# Patient Record
Sex: FEMALE | Race: WHITE | NOT HISPANIC OR LATINO | Employment: PART TIME | URBAN - METROPOLITAN AREA
[De-identification: names, ages, dates, MRNs, and addresses within clinical notes are randomized per-mention and may not be internally consistent; named-entity substitution may affect disease eponyms.]

---

## 2024-05-21 ENCOUNTER — OFFICE VISIT (OUTPATIENT)
Dept: FAMILY MEDICINE | Facility: CLINIC | Age: 59
End: 2024-05-21
Payer: COMMERCIAL

## 2024-05-21 VITALS
OXYGEN SATURATION: 97 % | HEART RATE: 75 BPM | SYSTOLIC BLOOD PRESSURE: 122 MMHG | HEIGHT: 71 IN | WEIGHT: 225.19 LBS | BODY MASS INDEX: 31.52 KG/M2 | DIASTOLIC BLOOD PRESSURE: 64 MMHG

## 2024-05-21 DIAGNOSIS — Z00.00 ANNUAL PHYSICAL EXAM: Primary | ICD-10-CM

## 2024-05-21 DIAGNOSIS — Z12.31 SCREENING MAMMOGRAM, ENCOUNTER FOR: ICD-10-CM

## 2024-05-21 DIAGNOSIS — Z11.59 NEED FOR HEPATITIS C SCREENING TEST: ICD-10-CM

## 2024-05-21 DIAGNOSIS — Z12.11 COLON CANCER SCREENING: ICD-10-CM

## 2024-05-21 DIAGNOSIS — Z83.3 FAMILY HISTORY OF DIABETES MELLITUS IN MOTHER: ICD-10-CM

## 2024-05-21 LAB — HBA1C MFR BLD: 5.3 %

## 2024-05-21 PROCEDURE — 3074F SYST BP LT 130 MM HG: CPT | Mod: CPTII,S$GLB,, | Performed by: NURSE PRACTITIONER

## 2024-05-21 PROCEDURE — 83036 HEMOGLOBIN GLYCOSYLATED A1C: CPT | Mod: QW,,, | Performed by: NURSE PRACTITIONER

## 2024-05-21 PROCEDURE — 3044F HG A1C LEVEL LT 7.0%: CPT | Mod: CPTII,S$GLB,, | Performed by: NURSE PRACTITIONER

## 2024-05-21 PROCEDURE — 99386 PREV VISIT NEW AGE 40-64: CPT | Mod: S$GLB,,, | Performed by: NURSE PRACTITIONER

## 2024-05-21 PROCEDURE — 3078F DIAST BP <80 MM HG: CPT | Mod: CPTII,S$GLB,, | Performed by: NURSE PRACTITIONER

## 2024-05-21 PROCEDURE — 1160F RVW MEDS BY RX/DR IN RCRD: CPT | Mod: CPTII,S$GLB,, | Performed by: NURSE PRACTITIONER

## 2024-05-21 PROCEDURE — 1159F MED LIST DOCD IN RCRD: CPT | Mod: CPTII,S$GLB,, | Performed by: NURSE PRACTITIONER

## 2024-05-21 PROCEDURE — 3008F BODY MASS INDEX DOCD: CPT | Mod: CPTII,S$GLB,, | Performed by: NURSE PRACTITIONER

## 2024-05-21 RX ORDER — CALCIUM CARBONATE 600 MG
1 TABLET ORAL DAILY
COMMUNITY

## 2024-05-21 RX ORDER — MULTIVITAMIN
1 TABLET ORAL DAILY
COMMUNITY

## 2024-05-21 RX ORDER — ZINC GLUCONATE 50 MG
50 TABLET ORAL DAILY
COMMUNITY

## 2024-05-21 RX ORDER — ACETAMINOPHEN 500 MG
500 TABLET ORAL EVERY 6 HOURS PRN
COMMUNITY

## 2024-05-21 RX ORDER — MINERAL OIL
180 ENEMA (ML) RECTAL DAILY
COMMUNITY

## 2024-05-21 RX ORDER — CYANOCOBALAMIN (VITAMIN B-12) 1000MCG/ML
DROPS ORAL
COMMUNITY

## 2024-05-21 NOTE — PATIENT INSTRUCTIONS
Please return for fasting bloodwork within the next 2 weeks    Naveed Bonilla MD- GI- call to schedule colonoscopy  [id number] [id number]  Phone: [phone]

## 2024-05-21 NOTE — PROGRESS NOTES
SUBJECTIVE:    Patient ID: Sussy Bowers is a 58 y.o. female.    Chief Complaint: Establish Care (Bottles brought//Pt is here to establish care with a PCP//WILIAM )    HPI    No visits with results within 6 Month(s) from this visit.   Latest known visit with results is:   No results found for any previous visit.       History reviewed. No pertinent past medical history.  Past Surgical History:   Procedure Laterality Date    CHOLECYSTECTOMY  1996    DENTAL SURGERY  2014     Family History   Problem Relation Name Age of Onset    Diabetes Mother      Kidney disease Brother      Cancer Maternal Uncle          lung    Diabetes Maternal Grandmother         Tests to Keep You Healthy    Mammogram: DUE  Colon Cancer Screening: DUE  Cervical Cancer Screening: DUE      The CVD Risk score (EESQUIEL'Agostino, et al., 2008) failed to calculate for the following reasons:    Cannot find a previous HDL lab    Cannot find a previous total cholesterol lab     Marital Status: Single  Alcohol History:  reports current alcohol use.  Tobacco History:  reports that she has never smoked. She has never been exposed to tobacco smoke. She has never used smokeless tobacco.  Drug History:  reports no history of drug use.    Health Maintenance Topics with due status: Not Due       Topic Last Completion Date    Influenza Vaccine Not Due       There is no immunization history on file for this patient.    Review of patient's allergies indicates:  No Known Allergies    Current Outpatient Medications:     acetaminophen (TYLENOL) 500 MG tablet, Take 500 mg by mouth every 6 (six) hours as needed for Pain., Disp: , Rfl:     calcium carbonate (OS-INDERJIT) 600 mg calcium (1,500 mg) Tab, Take 1 tablet by mouth once daily., Disp: , Rfl:     Echinacea pur xt/goldenseal xt (ECHINACEA AND GOLDENSEAL ORAL), Take 900 mg by mouth once daily., Disp: , Rfl:     fexofenadine (ALLEGRA) 180 MG tablet, Take 180 mg by mouth once daily., Disp: , Rfl:     melatonin 1 mg Subl, Place  "under the tongue., Disp: , Rfl:     multivitamin (THERAGRAN) per tablet, Take 1 tablet by mouth once daily., Disp: , Rfl:     zinc gluconate 50 mg tablet, Take 50 mg by mouth once daily., Disp: , Rfl:     Review of Systems   Constitutional:  Negative for appetite change, chills and fever.   Respiratory:  Negative for cough, shortness of breath and wheezing.    Cardiovascular:  Negative for chest pain, palpitations and leg swelling.   Gastrointestinal:  Negative for abdominal pain, constipation and diarrhea.   Genitourinary:  Negative for dysuria, frequency and hematuria.   Musculoskeletal:  Negative for back pain and gait problem.   Skin:  Negative for rash.   Neurological:  Negative for dizziness, syncope and numbness.   Psychiatric/Behavioral:  Negative for dysphoric mood. The patient is not nervous/anxious.           Objective:      Vitals:    05/21/24 1456   BP: 122/64   Pulse: 75   SpO2: 97%   Weight: 102.2 kg (225 lb 3.2 oz)   Height: 5' 10.5" (1.791 m)     Physical Exam  Vitals reviewed.   Constitutional:       General: She is not in acute distress.     Appearance: She is well-developed.   HENT:      Head: Normocephalic and atraumatic.      Right Ear: Tympanic membrane and ear canal normal.      Left Ear: Tympanic membrane and ear canal normal.      Mouth/Throat:      Pharynx: No posterior oropharyngeal erythema.   Neck:      Vascular: No carotid bruit.   Cardiovascular:      Rate and Rhythm: Normal rate and regular rhythm.      Heart sounds: No murmur heard.  Pulmonary:      Effort: Pulmonary effort is normal. No respiratory distress.      Breath sounds: Normal breath sounds. No wheezing or rales.   Abdominal:      General: There is no distension.      Palpations: Abdomen is soft.      Tenderness: There is no abdominal tenderness.   Musculoskeletal:      Cervical back: Neck supple.      Right lower leg: No edema.      Left lower leg: No edema.   Lymphadenopathy:      Cervical: No cervical adenopathy. "   Skin:     General: Skin is warm and dry.      Findings: No rash.   Neurological:      General: No focal deficit present.      Mental Status: She is alert and oriented to person, place, and time.      Gait: Gait normal.   Psychiatric:         Mood and Affect: Mood normal.           Assessment:       1. Annual physical exam           Plan:       1. Annual physical exam      No follow-ups on file.          Counseled on age and gender appropriate medical preventative services, including cancer screenings, immunizations, overall nutritional health, need for a consistent exercise regimen and an overall push towards maintaining a vigorous and active lifestyle.      5/21/2024 Elida Robles NP

## 2024-05-21 NOTE — PROGRESS NOTES
"  SUBJECTIVE:    Patient ID: Sussy Bowers is a 58 y.o. female.    Chief Complaint: Establish Care (Bottles brought//Pt is here to establish care with a PCP//WILIAM )      Pt here for new pt appt. Former pt of Dr. Irving but hasn't had PCP in several years    Pt reports overall she's been healthy. No prescription medication. Nonsmoker, drinks 2-3 beers on the weekends. Admits doesn't really follow a diet and hasn't been walking for exercise since her dog .    Pt reports at last eye exam in Feb was told they saw "a christopher" in her eye which could be indication of early DM. Denies any blurred vision. no polyuria, polydipsia or polyphagia. Her mother had DM2    Seen in ER last month after cutting finger cutting limbs with a chainsaw- had lac to left 3rd finger which required sutures- no tendon injury and wound completely healed with full ROM/sensation intact    Has never had colon CA screening and last mammogram was years ago      Office Visit on 2024   Component Date Value Ref Range Status    Hemoglobin A1C, POC 2024 5.3  % Final       History reviewed. No pertinent past medical history.  Past Surgical History:   Procedure Laterality Date    CHOLECYSTECTOMY      DENTAL SURGERY      TONSILLECTOMY       Family History   Problem Relation Name Age of Onset    Diabetes Mother      Rheum arthritis Mother      Atrial fibrillation Mother      Stroke Brother      Kidney disease Brother      Cancer Maternal Uncle          lung    Diabetes Maternal Grandmother      Cancer Maternal Cousin          breast CA       Tests to Keep You Healthy    Mammogram: ORDERED BUT NOT SCHEDULED  Colon Cancer Screening: DUE  Cervical Cancer Screening: DUE      Marital Status: Single  Alcohol History:  reports current alcohol use of about 4.0 - 6.0 standard drinks of alcohol per week.  Tobacco History:  reports that she has never smoked. She has never been exposed to tobacco smoke. She has never used smokeless tobacco.  Drug " "History:  reports no history of drug use.    Review of patient's allergies indicates:  No Known Allergies    Current Outpatient Medications:     acetaminophen (TYLENOL) 500 MG tablet, Take 500 mg by mouth every 6 (six) hours as needed for Pain., Disp: , Rfl:     calcium carbonate (OS-INDERJIT) 600 mg calcium (1,500 mg) Tab, Take 1 tablet by mouth once daily., Disp: , Rfl:     Echinacea pur xt/goldenseal xt (ECHINACEA AND GOLDENSEAL ORAL), Take 900 mg by mouth once daily., Disp: , Rfl:     fexofenadine (ALLEGRA) 180 MG tablet, Take 180 mg by mouth once daily., Disp: , Rfl:     melatonin 1 mg Subl, Place under the tongue., Disp: , Rfl:     multivitamin (THERAGRAN) per tablet, Take 1 tablet by mouth once daily., Disp: , Rfl:     zinc gluconate 50 mg tablet, Take 50 mg by mouth once daily., Disp: , Rfl:     Review of Systems   Constitutional:  Negative for appetite change, chills, fever and unexpected weight change.   HENT:  Negative for sore throat and trouble swallowing.    Eyes:  Negative for visual disturbance.   Respiratory:  Negative for cough, shortness of breath and wheezing.    Cardiovascular:  Negative for chest pain, palpitations and leg swelling.   Gastrointestinal:  Negative for abdominal pain, constipation, diarrhea, nausea and vomiting.   Endocrine: Negative for polydipsia, polyphagia and polyuria.   Genitourinary:  Negative for dysuria, frequency and hematuria.   Musculoskeletal:  Positive for arthralgias (occasional knee pain). Negative for back pain and gait problem.   Skin:  Negative for rash.   Neurological:  Negative for dizziness, syncope, speech difficulty, numbness and headaches.   Psychiatric/Behavioral:  Negative for dysphoric mood. The patient is not nervous/anxious.           Objective:      Vitals:    05/21/24 1456   BP: 122/64   Pulse: 75   SpO2: 97%   Weight: 102.2 kg (225 lb 3.2 oz)   Height: 5' 10.5" (1.791 m)     Physical Exam  Vitals reviewed.   Constitutional:       General: She is not in " acute distress.     Appearance: She is well-developed. She is obese.      Comments: Obese WF   HENT:      Head: Normocephalic and atraumatic.      Right Ear: Tympanic membrane and ear canal normal.      Left Ear: Tympanic membrane and ear canal normal.   Neck:      Vascular: No carotid bruit.   Cardiovascular:      Rate and Rhythm: Normal rate and regular rhythm.      Heart sounds: No murmur heard.  Pulmonary:      Effort: Pulmonary effort is normal. No respiratory distress.      Breath sounds: Normal breath sounds. No wheezing or rales.   Abdominal:      General: There is no distension.      Palpations: Abdomen is soft.      Tenderness: There is no abdominal tenderness.   Musculoskeletal:      Cervical back: Neck supple.      Right lower leg: No edema.      Left lower leg: No edema.   Lymphadenopathy:      Cervical: No cervical adenopathy.   Skin:     General: Skin is warm and dry.      Findings: No rash.   Neurological:      General: No focal deficit present.      Mental Status: She is alert and oriented to person, place, and time.      Gait: Gait normal.   Psychiatric:         Mood and Affect: Mood normal.           Assessment:       1. Annual physical exam    2. Family history of diabetes mellitus in mother    3. Colon cancer screening    4. Screening mammogram, encounter for    5. Need for hepatitis C screening test           Plan:       Annual physical exam  -baseline labs ordered  -     CBC Auto Differential; Future; Expected date: 05/21/2024  -     Comprehensive Metabolic Panel; Future; Expected date: 05/21/2024  -     Lipid Panel; Future; Expected date: 05/21/2024  -     TSH w/reflex to FT4; Future; Expected date: 05/21/2024  -     Urinalysis, Reflex to Urine Culture Urine, Clean Catch; Future; Expected date: 05/21/2024    Family history of diabetes mellitus in mother  -A1C today 5.3%. encouraged healthy diet changes and regular exercise  -     Hemoglobin A1C, POCT    Colon cancer screening  -discussed imp  of colon CA screening and encouraged her to schedule cscope  -     Ambulatory referral/consult to Gastroenterology; Future; Expected date: 05/28/2024    Screening mammogram, encounter for  -mammo ordered  -     Mammo Digital Screening Bilat w/ Pablo; Future; Expected date: 05/21/2024    Need for hepatitis C screening test  -     Hepatitis C Antibody; Future; Expected date: 05/21/2024      Follow up in about 6 months (around 11/21/2024) for labs within next 2 weeks.        5/21/2024 Elida Robles, NP

## 2024-05-24 ENCOUNTER — HOSPITAL ENCOUNTER (OUTPATIENT)
Dept: RADIOLOGY | Facility: HOSPITAL | Age: 59
Discharge: HOME | End: 2024-05-24
Attending: NURSE PRACTITIONER
Payer: COMMERCIAL

## 2024-05-24 DIAGNOSIS — Z12.31 SCREENING MAMMOGRAM, ENCOUNTER FOR: ICD-10-CM

## 2024-05-24 PROCEDURE — 77063 BREAST TOMOSYNTHESIS BI: CPT | Mod: 26,,, | Performed by: RADIOLOGY

## 2024-05-24 PROCEDURE — 77067 SCR MAMMO BI INCL CAD: CPT | Mod: 26,,, | Performed by: RADIOLOGY

## 2024-05-24 PROCEDURE — 77067 SCR MAMMO BI INCL CAD: CPT | Mod: TC

## 2024-05-27 LAB
ALBUMIN SERPL-MCNC: 4.2 G/DL (ref 3.6–5.1)
ALBUMIN/GLOB SERPL: 1.7 (CALC) (ref 1–2.5)
ALP SERPL-CCNC: 73 U/L (ref 37–153)
ALT SERPL-CCNC: 11 U/L (ref 6–29)
APPEARANCE UR: CLEAR
AST SERPL-CCNC: 11 U/L (ref 10–35)
BACTERIA #/AREA URNS HPF: ABNORMAL /HPF
BACTERIA UR CULT: ABNORMAL
BACTERIA UR CULT: ABNORMAL
BASOPHILS # BLD AUTO: 17 CELLS/UL (ref 0–200)
BASOPHILS NFR BLD AUTO: 0.3 %
BILIRUB SERPL-MCNC: 0.5 MG/DL (ref 0.2–1.2)
BILIRUB UR QL STRIP: NEGATIVE
BUN SERPL-MCNC: 12 MG/DL (ref 7–25)
BUN/CREAT SERPL: NORMAL (CALC) (ref 6–22)
CALCIUM SERPL-MCNC: 9.3 MG/DL (ref 8.6–10.4)
CHLORIDE SERPL-SCNC: 106 MMOL/L (ref 98–110)
CHOLEST SERPL-MCNC: 194 MG/DL
CHOLEST/HDLC SERPL: 2.7 (CALC)
CO2 SERPL-SCNC: 24 MMOL/L (ref 20–32)
COLOR UR: YELLOW
CREAT SERPL-MCNC: 0.75 MG/DL (ref 0.5–1.03)
EGFR: 92 ML/MIN/1.73M2
EOSINOPHIL # BLD AUTO: 70 CELLS/UL (ref 15–500)
EOSINOPHIL NFR BLD AUTO: 1.2 %
ERYTHROCYTE [DISTWIDTH] IN BLOOD BY AUTOMATED COUNT: 11.9 % (ref 11–15)
GLOBULIN SER CALC-MCNC: 2.5 G/DL (CALC) (ref 1.9–3.7)
GLUCOSE SERPL-MCNC: 98 MG/DL (ref 65–99)
GLUCOSE UR QL STRIP: NEGATIVE
HCT VFR BLD AUTO: 37.9 % (ref 35–45)
HCV AB SERPL QL IA: NORMAL
HDLC SERPL-MCNC: 72 MG/DL
HGB BLD-MCNC: 12.6 G/DL (ref 11.7–15.5)
HGB UR QL STRIP: NEGATIVE
HYALINE CASTS #/AREA URNS LPF: ABNORMAL /LPF
KETONES UR QL STRIP: NEGATIVE
LDLC SERPL CALC-MCNC: 107 MG/DL (CALC)
LEUKOCYTE ESTERASE UR QL STRIP: ABNORMAL
LYMPHOCYTES # BLD AUTO: 1972 CELLS/UL (ref 850–3900)
LYMPHOCYTES NFR BLD AUTO: 34 %
MCH RBC QN AUTO: 33.3 PG (ref 27–33)
MCHC RBC AUTO-ENTMCNC: 33.2 G/DL (ref 32–36)
MCV RBC AUTO: 100.3 FL (ref 80–100)
MONOCYTES # BLD AUTO: 470 CELLS/UL (ref 200–950)
MONOCYTES NFR BLD AUTO: 8.1 %
NEUTROPHILS # BLD AUTO: 3271 CELLS/UL (ref 1500–7800)
NEUTROPHILS NFR BLD AUTO: 56.4 %
NITRITE UR QL STRIP: NEGATIVE
NONHDLC SERPL-MCNC: 122 MG/DL (CALC)
PH UR STRIP: 6 [PH] (ref 5–8)
PLATELET # BLD AUTO: 220 THOUSAND/UL (ref 140–400)
PMV BLD REES-ECKER: 9.9 FL (ref 7.5–12.5)
POTASSIUM SERPL-SCNC: 4.3 MMOL/L (ref 3.5–5.3)
PROT SERPL-MCNC: 6.7 G/DL (ref 6.1–8.1)
PROT UR QL STRIP: NEGATIVE
RBC # BLD AUTO: 3.78 MILLION/UL (ref 3.8–5.1)
RBC #/AREA URNS HPF: ABNORMAL /HPF
SERVICE CMNT-IMP: ABNORMAL
SODIUM SERPL-SCNC: 140 MMOL/L (ref 135–146)
SP GR UR STRIP: 1.02 (ref 1–1.03)
SQUAMOUS #/AREA URNS HPF: ABNORMAL /HPF
TRIGL SERPL-MCNC: 63 MG/DL
TSH SERPL-ACNC: 1.1 MIU/L (ref 0.4–4.5)
WBC # BLD AUTO: 5.8 THOUSAND/UL (ref 3.8–10.8)
WBC #/AREA URNS HPF: ABNORMAL /HPF

## 2024-06-04 ENCOUNTER — TELEPHONE (OUTPATIENT)
Dept: FAMILY MEDICINE | Facility: CLINIC | Age: 59
End: 2024-06-04
Payer: COMMERCIAL

## 2024-08-26 PROBLEM — Z00.00 ANNUAL PHYSICAL EXAM: Status: RESOLVED | Noted: 2024-05-21 | Resolved: 2024-08-26

## 2025-02-25 ENCOUNTER — TELEPHONE (OUTPATIENT)
Dept: FAMILY MEDICINE | Facility: CLINIC | Age: 60
End: 2025-02-25
Payer: COMMERCIAL

## 2025-02-25 DIAGNOSIS — Z00.00 ANNUAL PHYSICAL EXAM: Primary | ICD-10-CM

## 2025-03-12 ENCOUNTER — OFFICE VISIT (OUTPATIENT)
Dept: FAMILY MEDICINE | Facility: CLINIC | Age: 60
End: 2025-03-12
Payer: COMMERCIAL

## 2025-03-12 VITALS
OXYGEN SATURATION: 98 % | WEIGHT: 218 LBS | BODY MASS INDEX: 30.52 KG/M2 | HEIGHT: 71 IN | SYSTOLIC BLOOD PRESSURE: 110 MMHG | HEART RATE: 70 BPM | DIASTOLIC BLOOD PRESSURE: 66 MMHG

## 2025-03-12 DIAGNOSIS — Z00.00 ANNUAL PHYSICAL EXAM: Primary | ICD-10-CM

## 2025-03-12 DIAGNOSIS — Z12.31 SCREENING MAMMOGRAM, ENCOUNTER FOR: ICD-10-CM

## 2025-03-12 DIAGNOSIS — Z01.419 ENCOUNTER FOR GYNECOLOGICAL EXAMINATION: ICD-10-CM

## 2025-03-12 DIAGNOSIS — Z13.820 SCREENING FOR OSTEOPOROSIS: ICD-10-CM

## 2025-03-12 DIAGNOSIS — R73.01 IFG (IMPAIRED FASTING GLUCOSE): ICD-10-CM

## 2025-03-12 DIAGNOSIS — Z12.11 COLON CANCER SCREENING: ICD-10-CM

## 2025-03-12 DIAGNOSIS — M51.360 DEGENERATION OF INTERVERTEBRAL DISC OF LUMBAR REGION WITH DISCOGENIC BACK PAIN: ICD-10-CM

## 2025-03-12 DIAGNOSIS — Z78.0 MENOPAUSE: ICD-10-CM

## 2025-03-12 NOTE — PROGRESS NOTES
SUBJECTIVE:    Patient ID: Sussy Bowers is a 59 y.o. female.    Chief Complaint: Annual Exam (No bottles//Pt here for annual//discuss lumbar xray in media from Dr. Montoya//declines colo and flu vacc//would like mammo order and referral to gyn//JL)      History of Present Illness    CHIEF COMPLAINT:  Sussy presents today for annual visit.    Pt reports overall she's been doing okay since last visit here    BACK PAIN:  She reports a history of pinched sciatic nerve. Recently experienced a flare-up of back pain after bending to  a sock, resulting in severe pain through the left lower back and upper left leg. She went and saw chiropractor who ordered lumbar xray- X-ray which showed moderate to severe degenerative disc disease, arthritis in the facet joints, and possible osteoporosis. Currently, the pain is localized to the lower back without radiation down the leg. She denies chest pain or shortness of breath. She is not currently taking any medications for pain management.     HEALTH SCREENING:  Pt has never had cscope though states she's aware she really should have done. Is due for f/u mammogram and requesting referral to gyn for well woman exam      LABS:  Complete blood count was normal. Blood sugar was 104 mg/dL, slightly elevated. Total cholesterol was 212 mg/dL with HDL at 66 mg/dL. LDL has increased from 107 mg/dL to 129 mg/dL. Triglycerides were normal. Thyroid function tests were normal. Urinalysis showed white blood cells without evidence of infection     DIET AND WEIGHT MANAGEMENT:  She reports irregular eating patterns and does not adhere to three meals a day. She regularly drinks soda and recently had increased cookie consumption affecting her blood sugar.    SOCIAL HISTORY:  She reports she's busy helping care for her uncle with chemotherapy appointments when her aunt is unavailable.      ROS:  General: no fever, no chills, no fatigue, no weight gain, no weight loss  Eyes: no vision  changes, no redness, no discharge  ENT: no ear pain, no nasal congestion, no sore throat  Cardiovascular: no chest pain, no palpitations, no lower extremity edema  Respiratory: no cough, no shortness of breath  Gastrointestinal: no abdominal pain, no nausea, no vomiting, no diarrhea, no constipation, no blood in stool  Genitourinary: no dysuria, no hematuria, no frequency  Musculoskeletal: no joint pain, no muscle pain, complains of back pain  Skin: no rash, no lesion  Neurological: no headache, no dizziness, no numbness, no tingling  Psychiatric: no anxiety, no depression, no sleep difficulty              Telephone on 02/25/2025   Component Date Value Ref Range Status    WBC 03/03/2025 6.1  3.8 - 10.8 Thousand/uL Final    RBC 03/03/2025 4.06  3.80 - 5.10 Million/uL Final    Hemoglobin 03/03/2025 13.3  11.7 - 15.5 g/dL Final    Hematocrit 03/03/2025 40.5  35.0 - 45.0 % Final    MCV 03/03/2025 99.8  80.0 - 100.0 fL Final    MCH 03/03/2025 32.8  27.0 - 33.0 pg Final    MCHC 03/03/2025 32.8  32.0 - 36.0 g/dL Final    RDW 03/03/2025 11.8  11.0 - 15.0 % Final    Platelets 03/03/2025 197  140 - 400 Thousand/uL Final    MPV 03/03/2025 10.4  7.5 - 12.5 fL Final    Neutrophils, Abs 03/03/2025 3,190  1,500 - 7,800 cells/uL Final    Lymph # 03/03/2025 2,227  850 - 3,900 cells/uL Final    Mono # 03/03/2025 543  200 - 950 cells/uL Final    Eos # 03/03/2025 110  15 - 500 cells/uL Final    Baso # 03/03/2025 31  0 - 200 cells/uL Final    Neutrophils Relative 03/03/2025 52.3  % Final    Lymph % 03/03/2025 36.5  % Final    Mono % 03/03/2025 8.9  % Final    Eosinophil % 03/03/2025 1.8  % Final    Basophil % 03/03/2025 0.5  % Final    Glucose 03/03/2025 104 (H)  65 - 99 mg/dL Final    BUN 03/03/2025 10  7 - 25 mg/dL Final    Creatinine 03/03/2025 0.81  0.50 - 1.05 mg/dL Final    eGFR 03/03/2025 83  > OR = 60 mL/min/1.73m2 Final    BUN/Creatinine Ratio 03/03/2025 SEE NOTE:  6 - 22 (calc) Final    Sodium 03/03/2025 140  135 - 146 mmol/L  Final    Potassium 03/03/2025 4.6  3.5 - 5.3 mmol/L Final    Chloride 03/03/2025 101  98 - 110 mmol/L Final    CO2 03/03/2025 30  20 - 32 mmol/L Final    Calcium 03/03/2025 9.5  8.6 - 10.4 mg/dL Final    Total Protein 03/03/2025 6.8  6.1 - 8.1 g/dL Final    Albumin 03/03/2025 4.3  3.6 - 5.1 g/dL Final    Globulin, Total 03/03/2025 2.5  1.9 - 3.7 g/dL (calc) Final    Albumin/Globulin Ratio 03/03/2025 1.7  1.0 - 2.5 (calc) Final    Total Bilirubin 03/03/2025 0.7  0.2 - 1.2 mg/dL Final    Alkaline Phosphatase 03/03/2025 69  37 - 153 U/L Final    AST 03/03/2025 11  10 - 35 U/L Final    ALT 03/03/2025 9  6 - 29 U/L Final    Cholesterol 03/03/2025 212 (H)  <200 mg/dL Final    HDL 03/03/2025 66  > OR = 50 mg/dL Final    Triglycerides 03/03/2025 76  <150 mg/dL Final    LDL Cholesterol 03/03/2025 129 (H)  mg/dL (calc) Final    HDL/Cholesterol Ratio 03/03/2025 3.2  <5.0 (calc) Final    Non HDL Chol. (LDL+VLDL) 03/03/2025 146 (H)  <130 mg/dL (calc) Final    TSH w/reflex to FT4 03/03/2025 1.55  0.40 - 4.50 mIU/L Final    Color, UA 03/03/2025 YELLOW  YELLOW Final    Appearance, UA 03/03/2025 CLEAR  CLEAR Final    Specific Gravity, UA 03/03/2025 1.015  1.001 - 1.035 Final    pH, UA 03/03/2025 5.5  5.0 - 8.0 Final    Glucose, UA 03/03/2025 NEGATIVE  NEGATIVE Final    Bilirubin, UA 03/03/2025 NEGATIVE  NEGATIVE Final    Ketones, UA 03/03/2025 NEGATIVE  NEGATIVE Final    Occult Blood UA 03/03/2025 NEGATIVE  NEGATIVE Final    Protein, UA 03/03/2025 NEGATIVE  NEGATIVE Final    Nitrite, UA 03/03/2025 NEGATIVE  NEGATIVE Final    Leukocytes, UA 03/03/2025 1+ (A)  NEGATIVE Final    WBC Casts, UA 03/03/2025 NONE SEEN  < OR = 5 /HPF Final    RBC Casts, UA 03/03/2025 NONE SEEN  < OR = 2 /HPF Final    Squam Epithel, UA 03/03/2025 NONE SEEN  < OR = 5 /HPF Final    Bacteria, UA 03/03/2025 NONE SEEN  NONE SEEN /HPF Final    Hyaline Casts, UA 03/03/2025 NONE SEEN  NONE SEEN /LPF Final    Service Cmt: 03/03/2025 SEE COMMENT   Final    Reflexive  "Urine Culture 03/03/2025 SEE COMMENT   Final    Urine Culture, Routine 03/03/2025 SEE COMMENT   Final       History reviewed. No pertinent past medical history.  Past Surgical History:   Procedure Laterality Date    CHOLECYSTECTOMY  1996    DENTAL SURGERY  2014    TONSILLECTOMY       Family History   Problem Relation Name Age of Onset    Diabetes Mother      Rheum arthritis Mother      Atrial fibrillation Mother      Stroke Brother      Kidney disease Brother      Cancer Maternal Uncle          lung    Diabetes Maternal Grandmother      Cancer Maternal Cousin          breast CA       Tests to Keep You Healthy    Mammogram: Met on 5/24/2024  Colon Cancer Screening: DUE  Cervical Cancer Screening: DUE      The 10-year CVD risk score (Terrell et al., 2008) is: 4.2%    Values used to calculate the score:      Age: 59 years      Sex: Female      Diabetic: No      Tobacco smoker: No      Systolic Blood Pressure: 110 mmHg      Is BP treated: No      HDL Cholesterol: 66 mg/dL      Total Cholesterol: 212 mg/dL     Marital Status: Single  Alcohol History:  reports current alcohol use of about 4.0 - 6.0 standard drinks of alcohol per week.  Tobacco History:  reports that she has never smoked. She has never been exposed to tobacco smoke. She has never used smokeless tobacco.  Drug History:  reports no history of drug use.    Health Maintenance Topics with due status: Not Due       Topic Last Completion Date    Hemoglobin A1c (Diabetic Prevention Screening) 05/21/2024    Lipid Panel 03/03/2025    RSV Vaccine (Age 60+ and Pregnant patients) Not Due       There is no immunization history on file for this patient.    Review of patient's allergies indicates:  No Known Allergies  Current Medications[1]        Objective:      Vitals:    03/12/25 0757   BP: 110/66   Pulse: 70   SpO2: 98%   Weight: 98.9 kg (218 lb)   Height: 5' 11" (1.803 m)       Physical Exam  Vitals reviewed.   Constitutional:       General: She is not in acute " distress.     Appearance: She is well-developed.      Comments: Obese WF   HENT:      Head: Normocephalic and atraumatic.      Right Ear: Tympanic membrane and ear canal normal.      Left Ear: Tympanic membrane and ear canal normal.   Neck:      Vascular: No carotid bruit.   Cardiovascular:      Rate and Rhythm: Normal rate and regular rhythm.      Heart sounds: No murmur heard.  Pulmonary:      Effort: Pulmonary effort is normal. No respiratory distress.      Breath sounds: Normal breath sounds. No wheezing or rales.   Abdominal:      General: There is no distension.      Palpations: Abdomen is soft.      Tenderness: There is no abdominal tenderness.   Musculoskeletal:      Cervical back: Neck supple.      Lumbar back: Tenderness present. No swelling, edema or bony tenderness. Negative right straight leg raise test and negative left straight leg raise test.        Back:       Right lower leg: No edema.      Left lower leg: No edema.   Lymphadenopathy:      Cervical: No cervical adenopathy.   Skin:     General: Skin is warm and dry.      Findings: No rash.   Neurological:      General: No focal deficit present.      Mental Status: She is alert and oriented to person, place, and time.      Gait: Gait normal.   Psychiatric:         Mood and Affect: Mood normal.          Assessment:       1. Annual physical exam    2. Degeneration of intervertebral disc of lumbar region with discogenic back pain    3. Screening mammogram, encounter for    4. Encounter for gynecological examination    5. Screening for osteoporosis    6. Menopause    7. Colon cancer screening           Assessment & Plan    - Reviewed x-ray showing moderate to severe degenerative disc disease and facet arthritis  - Noted osteoporosis findings on x-ray, recommending bone density scan for further evaluation  - Assessed cholesterol levels: total 212, HDL 66,  (increased from 107), triglycerides normal      LUMBAR RADICULOPATHY AND DISC DISORDERS:  -  Evaluated the patient's condition, noting a history of pinched sciatic nerve and recent pain shooting through the left leg and lower back. Has been seeing chiropractor and pain is now improved. No neuro deficits or red flags  - recommend Aleve (naproxen) to be taken twice daily for 3-5 days during pain flare-ups.  - Advised continuing with stretching exercises, heat and cold therapy as recommended by the chiropractor.  - Recommend weight loss to help alleviate orthopedic pain.  - Reviewed x-ray results showing moderate to severe degenerative disc disease in the lumbar region, with disc height loss.      BONE DENSITY DISORDER:  - Ordered a bone density scan (DEXA scan) to further evaluate potential bone thinning.  - Recommend vitamin D3, K2, and magnesium supplements to support bone health.      IMPAIRED FASTING GLUCOSE:  - Monitored blood sugar level, which was 104 mg/dL, 4 points above normal.  - Attributed elevated blood sugar to recent cookie consumption.  - Advised reducing intake of processed sugar, cookies, and sodas.      OVERWEIGHT:  - Educated on the benefits of weight loss for orthopedic pain management.  - Recommend aiming for 5-10 pound weight loss initially  - Sussy to reduce consumption of processed sugars, particularly soft drinks.  - Explained the impact of sugary drinks on weight and overall health.      FOLLOW-UP AND REFERRALS:  - Mammogram and DEXA ordered.  - Referred to Dr. Porter (gynecologist) for routine check.  - Referred to Dr. Irene Hall (GI doctor) for colonoscopy.         Plan:       1. Annual physical exam    2. Degeneration of intervertebral disc of lumbar region with discogenic back pain    3. Screening mammogram, encounter for  -     Mammo Digital Screening Bilat w/ Pablo (XPD); Future; Expected date: 03/12/2025    4. Encounter for gynecological examination  -     Ambulatory referral/consult to Obstetrics / Gynecology; Future; Expected date: 03/19/2025    5. Screening for  osteoporosis  -     DXA Bone Density Axial Skeleton 1 or more sites; Future; Expected date: 03/19/2025    6. Menopause  -     DXA Bone Density Axial Skeleton 1 or more sites; Future; Expected date: 03/19/2025    7. Colon cancer screening  -     Ambulatory referral/consult to Gastroenterology; Future; Expected date: 03/19/2025      Follow up in about 1 year (around 3/12/2026) for annual visit.          Counseled on age and gender appropriate medical preventative services, including cancer screenings, immunizations, overall nutritional health, need for a consistent exercise regimen and an overall push towards maintaining a vigorous and active lifestyle.      This note was generated with the assistance of ambient listening technology. Verbal consent was obtained by the patient and accompanying visitor(s) for the recording of patient appointment to facilitate this note. I attest to having reviewed and edited the generated note for accuracy, though some syntax or spelling errors may persist. Please contact the author of this note for any clarification.       3/12/2025 Elida Robles NP           [1]   Current Outpatient Medications:     acetaminophen (TYLENOL) 500 MG tablet, Take 500 mg by mouth every 6 (six) hours as needed for Pain., Disp: , Rfl:     Echinacea pur xt/goldenseal xt (ECHINACEA AND GOLDENSEAL ORAL), Take 900 mg by mouth once daily., Disp: , Rfl:     fexofenadine (ALLEGRA) 180 MG tablet, Take 180 mg by mouth once daily., Disp: , Rfl:     calcium carbonate (OS-INDERJIT) 600 mg calcium (1,500 mg) Tab, Take 1 tablet by mouth once daily. (Patient not taking: Reported on 3/12/2025), Disp: , Rfl:     melatonin 1 mg Subl, Place under the tongue. (Patient not taking: Reported on 3/12/2025), Disp: , Rfl:     multivitamin (THERAGRAN) per tablet, Take 1 tablet by mouth once daily. (Patient not taking: Reported on 3/12/2025), Disp: , Rfl:     zinc gluconate 50 mg tablet, Take 50 mg by mouth once daily. (Patient not  taking: Reported on 3/12/2025), Disp: , Rfl:

## 2025-03-12 NOTE — PATIENT INSTRUCTIONS
Lucia Porter MD- gynecology  [address]  [place] [address]  Phone: [phone]    Naveed Bonilla MD-gastroenterology  [address]  [place] [address]  Phone: [phone]

## 2025-05-02 ENCOUNTER — OFFICE VISIT (OUTPATIENT)
Dept: OBSTETRICS AND GYNECOLOGY | Facility: CLINIC | Age: 60
End: 2025-05-02
Payer: COMMERCIAL

## 2025-05-02 VITALS
DIASTOLIC BLOOD PRESSURE: 70 MMHG | HEIGHT: 71 IN | WEIGHT: 219.94 LBS | BODY MASS INDEX: 30.79 KG/M2 | SYSTOLIC BLOOD PRESSURE: 120 MMHG

## 2025-05-02 DIAGNOSIS — Z12.4 CERVICAL CANCER SCREENING: ICD-10-CM

## 2025-05-02 DIAGNOSIS — Z01.419 ENCOUNTER FOR GYNECOLOGICAL EXAMINATION: ICD-10-CM

## 2025-05-02 DIAGNOSIS — Z01.419 WELL WOMAN EXAM: Primary | ICD-10-CM

## 2025-05-02 PROCEDURE — 99999 PR PBB SHADOW E&M-EST. PATIENT-LVL III: CPT | Mod: PBBFAC,,, | Performed by: GENERAL PRACTICE

## 2025-05-02 NOTE — PATIENT INSTRUCTIONS
PAP SMEARS:    Screening for cervical cancer involves 1 or 2 parts based on your age and situation:  PAP smear (looking at the cells from your cervix)  HPV testing (checking whether you have the Human Papilloma Virus in your cervical cells)    These test results often come back at different times, but you won't hear from me until they BOTH are back since both pieces of information are important in deciding what to do next.    Soif you see a PAP result in [facility] (or an HPV result) that is abnormal, please allow another 7-10 business days before you send a message asking what to do next.  I am waiting for the other test result before I make a recommendation.    If both tests are resulted in MyChart, you should hear from me within 2-3 business days.    Abnormal tests will be followed up in one of two ways:  Repeat testing of PAP and/or HPV  Colposcopy (examination and biopsy of your cervix in the office using staining solutions and magnification)

## 2025-05-02 NOTE — PROGRESS NOTES
ASSESSMENT AND PLAN   2025  59 y.o.  for [employer].  No questions or concerns today. Skin lesion on left breast.    Offered Derm referral. Patient prefers to try course of hydrocortisone before pursuing biopsy.  Cervical Cancer screening: f/u results of PAP / HPV --> if both negative then next screen in 3 yrs  Mammogram: scheduled soon  Encouraged self breast awareness; RTC for breast concerns  Return to clinic: for periodic GYN wellness exam, every 1-3 years per patient preference and comfort level    Leslie L Weeks, MD [facility] [place] [facility]    SUBJECTIVE   2025  Sussy Bowers is a 59 y.o. here for WWE.  No questions or concerns today. Last PAP maybe 10yrs ago.    G'sP's:   LMP:  / age 44  Relationship: not sexually active and   PAP Hx: no h/o abnormals  MMG (24) = negative, recommend 1-yr f/u  HRT: never used   BLEEDING: denies     OBJECTIVE   PHYSICAL EXAM  Vitals:    25 0846   BP: 120/70     GEN = alert/oriented, nad, pleasant  HEENT = sclera anicteric, EOM grossly normal  BREASTS = nontender, no suspicious masses palpated, no nipple discharge, left breast in upper-outer quadrant has a 1cm scaly orange-flesh colored macule  CV = BP and HR as per vitals  PULM = normal respiratory effort   =      External: moderate atrophy     Vagina: moderately atrophied, urethral meatus normal     Discharge: normal and physiologic     Cervix: normal-appearing, atrophic, os stenotic, PAP smear obtained     Bimanual: uterus normal size and nontender, no adnexal masses or tenderness, exam limited by habitus        HISTORY   Date taken or verified: 2025     GYNECOLOGIC HISTORY  PAP Hx: no h/o abnormals  Genital HSV: No  Other STD Hx: denies    Menarche: 12yoa  Pain -- Non-menstrual pelvic pain: No / Dyspareunia: denies  Other -- Vasomotor Sxs: denies / Vaginal dryness: denies    OBSTETRIC HISTORY  G0    SOCIAL HISTORY  Lives with: self  Smoker: non-smoker / Alcohol: yes,  socially / Drugs: denies  Domestic Violence: No  Occupation: retired from [employer] firm, worked in [employer]    FAMILY HISTORY  BLEEDING or CLOTTING DISORDERS: none  BREAST CA: cousin / UTERINE CA: none / OVARIAN CA: none  COLON CA: uncle     PAST MEDICAL HISTORY  -------------------------------------    BMI 30.0-30.9,adult       PAST SURGICAL HISTORY  ----------------------------    Cholecystectomy    Dental surgery    Tonsillectomy     ALLERGIES  Review of patient's allergies indicates:  No Known Allergies    MEDICATIONS  Current Outpatient Medications   Medication Instructions    acetaminophen (TYLENOL) 500 mg, Every 6 hours PRN    calcium carbonate (OS-INDERJIT) 600 mg calcium (1,500 mg) Tab 1 tablet, Daily    Echinacea pur xt/goldenseal xt (ECHINACEA AND GOLDENSEAL ORAL) 900 mg, Daily    fexofenadine (ALLEGRA) 180 mg, Daily    melatonin 1 mg Subl Place under the tongue.    multivitamin (THERAGRAN) per tablet 1 tablet, Daily    zinc gluconate 50 mg, Daily

## 2025-05-08 ENCOUNTER — RESULTS FOLLOW-UP (OUTPATIENT)
Dept: OBSTETRICS AND GYNECOLOGY | Facility: CLINIC | Age: 60
End: 2025-05-08

## 2025-05-26 ENCOUNTER — RESULTS FOLLOW-UP (OUTPATIENT)
Dept: FAMILY MEDICINE | Facility: CLINIC | Age: 60
End: 2025-05-26

## 2025-05-26 ENCOUNTER — HOSPITAL ENCOUNTER (OUTPATIENT)
Dept: RADIOLOGY | Facility: HOSPITAL | Age: 60
Discharge: HOME | End: 2025-05-26
Attending: NURSE PRACTITIONER
Payer: COMMERCIAL

## 2025-05-26 VITALS — BODY MASS INDEX: 30.66 KG/M2 | HEIGHT: 71 IN | WEIGHT: 219 LBS

## 2025-05-26 DIAGNOSIS — Z78.0 MENOPAUSE: ICD-10-CM

## 2025-05-26 DIAGNOSIS — Z12.31 SCREENING MAMMOGRAM, ENCOUNTER FOR: ICD-10-CM

## 2025-05-26 DIAGNOSIS — Z13.820 SCREENING FOR OSTEOPOROSIS: ICD-10-CM

## 2025-05-26 PROCEDURE — 77080 DXA BONE DENSITY AXIAL: CPT | Mod: 26,,, | Performed by: RADIOLOGY

## 2025-05-26 PROCEDURE — 77063 BREAST TOMOSYNTHESIS BI: CPT | Mod: TC,PO

## 2025-05-26 PROCEDURE — 77067 SCR MAMMO BI INCL CAD: CPT | Mod: 26,,, | Performed by: RADIOLOGY

## 2025-05-26 PROCEDURE — 77063 BREAST TOMOSYNTHESIS BI: CPT | Mod: 26,,, | Performed by: RADIOLOGY

## 2025-05-26 PROCEDURE — 77080 DXA BONE DENSITY AXIAL: CPT | Mod: TC,PO
